# Patient Record
Sex: MALE | Race: BLACK OR AFRICAN AMERICAN | Employment: STUDENT | URBAN - METROPOLITAN AREA
[De-identification: names, ages, dates, MRNs, and addresses within clinical notes are randomized per-mention and may not be internally consistent; named-entity substitution may affect disease eponyms.]

---
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
## 2023-09-18 NOTE — PROGRESS NOTES
PHYSICAL THERAPY - DAILY TREATMENT NOTE    Patient Name: Laine Campos        Date: 2021  : 2005   YES Patient  Verified  Visit #:     Insurance: Payor: ANDREWS / Plan: Kaia Bolden / Product Type:  /      In time: 1120 Out time: 6005   Total Treatment Time: 55     BCBS/Medicare Time Tracking (below)   Total Timed Codes (min):  55 1:1 Treatment Time:  25     TREATMENT AREA =  Right knee pain [M25.561]    SUBJECTIVE  Pain Level (on 0 to 10 scale):  0  / 10   Medication Changes/New allergies or changes in medical history, any new surgeries or procedures? NO    If yes, update Summary List   Subjective Functional Status/Changes:  []  No changes reported     I feel great with the running, albina done 5 different days and I havent been more swollen. I ice immediately after and that helps. Pt arrived 15 min late to appointment. Modalities Rationale:     decrease edema, decrease inflammation and decrease pain to improve patient's ability to perform pain-free ADLs.    15 min [x] Estim, type/location: IFC R knee supine                                     []  att     [x]  unatt     []  w/US     [x]  w/ice    []  w/heat    min []  Mechanical Traction: type/lbs                   []  pro   []  sup   []  int   []  cont    []  before manual    []  after manual    min []  Ultrasound, settings/location:      min []  Iontophoresis w/ dexamethasone, location:                                               []  take home patch       []  in clinic    min []  Ice     []  Heat    location/position:     min []  Vasopneumatic Device, press/temp:    If using vaso (only need to measure limb vaso being performed on)      pre-treatment girth :       post-treatment girth :       measured at (landmark location) :      min []  Other:    [x] Skin assessment post-treatment (if applicable):    [x]  intact    [x]  redness- no adverse reaction                  []redness  adverse reaction: 15/10 min Therapeutic Exercise:  [x]  See flow sheet   Rationale:      increase ROM, increase strength, improve coordination and improve balance to improve the patients ability to perform unlimited ADLs. 25/15 min Therapeutic Activity: [x]  See flow sheet   Rationale:    increase strength, improve coordination, improve balance and increase proprioception to improve the patients ability to resume recreational activity    Billed With/As:   [x] TE   [] TA   [] Neuro   [] Self Care Patient Education: [x] Review HEP    [] Progressed/Changed HEP based on:   [] positioning   [] body mechanics   [] transfers   [] heat/ice application    [] other:      Other Objective/Functional Measures: Added standing quad stretch, broad jumps 2-2 and 1-2; unable to perform 1-1 with correct mechanics    Held multiple exercises due to time constraint and mild inc in swelling in lateral patellar area     Post Treatment Pain Level (on 0 to 10) scale:   0  / 10     ASSESSMENT  Assessment/Changes in Function:     Significant improvements compared to last session with plyometric exercises; pt loaded and landed jumps with equal mechanics bilaterally and was able to hold landing for 2-3 sec with appropriate posterior weight shift. Difficulty with 1-1 jumps as pt had difficulty getting clearance from the floor; pt reported fatigue through quadricep but no pain   []  See Progress Note/Recertification   Patient will continue to benefit from skilled PT services to modify and progress therapeutic interventions, address functional mobility deficits, address ROM deficits, address strength deficits, analyze and address soft tissue restrictions, analyze and cue movement patterns, analyze and modify body mechanics/ergonomics and assess and modify postural abnormalities to attain remaining goals. Progress toward goals / Updated goals:    Progressing towards LTG 2.       PLAN  [x]  Upgrade activities as tolerated YES Continue plan of care []  Discharge due to :    []  Other:      Therapist: Dalton Gamez DPT    Date: 11/17/2021 Time: 11:23 AM     Future Appointments   Date Time Provider Jose Forrest   11/18/2021 11:45 AM Miguel A Bailey PT [place] [facility] Yes DISPLAY PLAN FREE TEXT Yes